# Patient Record
Sex: MALE | Race: WHITE | NOT HISPANIC OR LATINO | URBAN - METROPOLITAN AREA
[De-identification: names, ages, dates, MRNs, and addresses within clinical notes are randomized per-mention and may not be internally consistent; named-entity substitution may affect disease eponyms.]

---

## 2023-10-13 ENCOUNTER — OFFICE VISIT (OUTPATIENT)
Dept: URGENT CARE | Facility: CLINIC | Age: 48
End: 2023-10-13
Payer: OTHER GOVERNMENT

## 2023-10-13 VITALS
TEMPERATURE: 99 F | OXYGEN SATURATION: 99 % | HEART RATE: 97 BPM | DIASTOLIC BLOOD PRESSURE: 81 MMHG | RESPIRATION RATE: 18 BRPM | SYSTOLIC BLOOD PRESSURE: 126 MMHG | WEIGHT: 130 LBS

## 2023-10-13 DIAGNOSIS — S01.01XA LACERATION OF SCALP WITHOUT FOREIGN BODY, INITIAL ENCOUNTER: Primary | ICD-10-CM

## 2023-10-13 PROCEDURE — 90715 TDAP VACCINE GREATER THAN OR EQUAL TO 7YO IM: ICD-10-PCS | Mod: S$GLB,,,

## 2023-10-13 PROCEDURE — 99204 OFFICE O/P NEW MOD 45 MIN: CPT | Mod: 25,S$GLB,,

## 2023-10-13 PROCEDURE — 12002 LACERATION REPAIR: ICD-10-PCS | Mod: S$GLB,,,

## 2023-10-13 PROCEDURE — 90471 PR IMMUNIZ ADMIN,1 SINGLE/COMB VAC/TOXOID: ICD-10-PCS | Mod: S$GLB,,,

## 2023-10-13 PROCEDURE — 12002 RPR S/N/AX/GEN/TRNK2.6-7.5CM: CPT | Mod: S$GLB,,,

## 2023-10-13 PROCEDURE — 90715 TDAP VACCINE 7 YRS/> IM: CPT | Mod: S$GLB,,,

## 2023-10-13 PROCEDURE — 90471 IMMUNIZATION ADMIN: CPT | Mod: S$GLB,,,

## 2023-10-13 PROCEDURE — 99204 PR OFFICE/OUTPT VISIT, NEW, LEVL IV, 45-59 MIN: ICD-10-PCS | Mod: 25,S$GLB,,

## 2023-10-13 RX ORDER — MUPIROCIN 20 MG/G
OINTMENT TOPICAL 2 TIMES DAILY
Qty: 15 G | Refills: 0 | Status: SHIPPED | OUTPATIENT
Start: 2023-10-13 | End: 2023-10-20

## 2023-10-13 RX ORDER — DEXTROAMPHETAMINE SACCHARATE, AMPHETAMINE ASPARTATE, DEXTROAMPHETAMINE SULFATE AND AMPHETAMINE SULFATE 7.5; 7.5; 7.5; 7.5 MG/1; MG/1; MG/1; MG/1
30 TABLET ORAL
COMMUNITY
Start: 2023-09-17

## 2023-10-13 RX ORDER — METHOCARBAMOL 750 MG/1
1 TABLET, FILM COATED ORAL NIGHTLY PRN
COMMUNITY
Start: 2023-05-03

## 2023-10-13 NOTE — PROGRESS NOTES
Subjective:      Patient ID: Eliazar Kendall is a 48 y.o. male.    Vitals:  weight is 59 kg (130 lb). His temperature is 98.6 °F (37 °C). His blood pressure is 126/81 and his pulse is 97. His respiration is 18 and oxygen saturation is 99%.     Chief Complaint: Laceration    Mr. Kendall, no significant past medical history, presents to clinic with a chief complaint of laceration scalp of head.  Patient reports he was at car wash attempting to vacuum truck.  He was climbing into the bed of his truck he went to stand up and cut his head on a metal sign.  He reports significant amount of bleeding prior to arrival denies any LOC.  Denies neck pain.  Denies headache.  Denies nausea.  Hemostasis was achieved prior to arrival.  There is a 4 cm laceration with exposed muscle and adipose tissue.  Chart review, last tetanus 2012.    Laceration   The incident occurred less than 1 hour ago. The laceration is located on the Scalp. The laceration is 4 cm in size. The laceration mechanism was a metal edge. The pain is at a severity of 6/10. The pain is moderate. The pain has been Constant since onset. He reports no foreign bodies present. His tetanus status is out of date.       Neck: Negative for neck pain.   Gastrointestinal:  Negative for nausea.   Skin:  Positive for laceration.   Allergic/Immunologic: Negative for immunizations up-to-date.   Neurological:  Negative for loss of balance, headaches and altered mental status.   Hematologic/Lymphatic: Negative for easy bruising/bleeding, history of blood clots and history of bleeding disorder. Does not bruise/bleed easily.   Psychiatric/Behavioral:  Negative for altered mental status.       Objective:     Physical Exam   Constitutional: He is oriented to person, place, and time. He appears well-developed. He is cooperative.  Non-toxic appearance. He does not appear ill. No distress. normal  HENT:   Head: Normocephalic and atraumatic.       Ears:   Right Ear: Hearing and external ear  normal.   Left Ear: Hearing and external ear normal.   Nose: Nose normal. No mucosal edema or nasal deformity. No epistaxis. Right sinus exhibits no maxillary sinus tenderness and no frontal sinus tenderness. Left sinus exhibits no maxillary sinus tenderness and no frontal sinus tenderness.   Mouth/Throat: Uvula is midline and mucous membranes are normal. Mucous membranes are moist. No trismus in the jaw. Normal dentition. No uvula swelling. No oropharyngeal exudate or posterior oropharyngeal erythema.   Eyes: Conjunctivae and lids are normal. Pupils are equal, round, and reactive to light. Extraocular movement intact   Neck: Trachea normal and phonation normal. Neck supple.   Cardiovascular: Normal rate, regular rhythm, normal heart sounds and normal pulses.   Pulmonary/Chest: Effort normal and breath sounds normal.   Abdominal: Normal appearance. flat abdomen   Musculoskeletal: Normal range of motion.         General: Normal range of motion.   Lymphadenopathy:     He has no cervical adenopathy.   Neurological: no focal deficit. He is alert, oriented to person, place, and time and at baseline. He exhibits normal muscle tone.   Skin: Skin is warm, dry, intact and not diaphoretic. Capillary refill takes 2 to 3 seconds. Lacerations - head:  scalp  Psychiatric: His speech is normal and behavior is normal. Mood, judgment and thought content normal.   Nursing note and vitals reviewed.      Assessment:     1. Laceration of scalp without foreign body, initial encounter        Plan:       Laceration of scalp without foreign body, initial encounter  -     mupirocin (BACTROBAN) 2 % ointment; Apply topically 2 (two) times daily. for 7 days  Dispense: 15 g; Refill: 0  -     (In Office Administered) Tdap Vaccine  -     Laceration Repair

## 2023-10-13 NOTE — PROCEDURES
"Laceration Repair    Date/Time: 10/13/2023 11:00 AM    Performed by: Ian Mendez FNP  Authorized by: Ian Mendez FNP  Consent Done: Yes  Consent: Verbal consent obtained.  Risks and benefits: risks, benefits and alternatives were discussed  Consent given by: patient  Patient understanding: patient states understanding of the procedure being performed  Patient consent: the patient's understanding of the procedure matches consent given  Procedure consent: procedure consent matches procedure scheduled  Relevant documents: relevant documents present and verified  Test results: test results available and properly labeled  Site marked: the operative site was marked  Imaging studies: imaging studies available  Required items: required blood products, implants, devices, and special equipment available  Patient identity confirmed: verbally with patient  Time out: Immediately prior to procedure a "time out" was called to verify the correct patient, procedure, equipment, support staff and site/side marked as required.  Body area: head/neck  Location details: scalp  Laceration length: 4 cm  Foreign bodies: no foreign bodies  Tendon involvement: none  Nerve involvement: none  Vascular damage: no  Anesthesia: local infiltration    Anesthesia:  Local Anesthetic: lidocaine 1% without epinephrine  Anesthetic total: 6 mL    Patient sedated: no  Preparation: Patient was prepped and draped in the usual sterile fashion.  Irrigation solution: saline  Irrigation method: jet lavage  Amount of cleaning: standard  Debridement: none  Skin closure: Ethilon  Number of sutures: 4  Technique: simple  Approximation: close  Approximation difficulty: simple  Dressing: antibiotic ointment and non-stick sterile dressing  Patient tolerance: Patient tolerated the procedure well with no immediate complications        "

## 2023-10-13 NOTE — PATIENT INSTRUCTIONS
Suture removal in 7 days  Do not allowed to soak in water  Do not use hydrogen peroxide or alcohol  Clean with regular soap and water do not scrub aggressively  Mupirocin ointment only enough to make the skin appear shiny  Keep covered with a nonadherent dressing

## 2023-10-20 ENCOUNTER — OFFICE VISIT (OUTPATIENT)
Dept: URGENT CARE | Facility: CLINIC | Age: 48
End: 2023-10-20
Payer: OTHER GOVERNMENT

## 2023-10-20 VITALS
TEMPERATURE: 98 F | HEART RATE: 92 BPM | RESPIRATION RATE: 16 BRPM | OXYGEN SATURATION: 99 % | SYSTOLIC BLOOD PRESSURE: 123 MMHG | WEIGHT: 179.63 LBS | DIASTOLIC BLOOD PRESSURE: 84 MMHG

## 2023-10-20 DIAGNOSIS — Z48.02 ENCOUNTER FOR REMOVAL OF SUTURES: Primary | ICD-10-CM

## 2023-10-20 PROCEDURE — 99214 OFFICE O/P EST MOD 30 MIN: CPT | Mod: S$GLB,,, | Performed by: NURSE PRACTITIONER

## 2023-10-20 PROCEDURE — 99214 PR OFFICE/OUTPT VISIT, EST, LEVL IV, 30-39 MIN: ICD-10-PCS | Mod: S$GLB,,, | Performed by: NURSE PRACTITIONER

## 2023-10-21 NOTE — PROCEDURES
Suture Removal    Date/Time: 10/20/2023 4:10 PM  Location procedure was performed: [facility]    Performed by: Loyda Medeiros NP  Authorized by: Loyda Medeiros NP  Body area: head/neck  Location details: scalp  Wound Appearance: clean, well healed, normal color, nontender and no drainage  Sutures Removed: 4  Post-removal: no dressing applied and antibiotic ointment applied  Facility: sutures placed in this facility  Complications: No  Estimated blood loss (mL): 0  Specimens: No  Implants: No  Patient tolerance: Patient tolerated the procedure well with no immediate complications

## 2023-10-21 NOTE — PROGRESS NOTES
Subjective:      Patient ID: Eliazar Kendall is a 48 y.o. male.    Vitals:  weight is 81.5 kg (179 lb 9.6 oz). His oral temperature is 97.5 °F (36.4 °C). His blood pressure is 123/84 and his pulse is 92. His respiration is 16 and oxygen saturation is 99%.     Chief Complaint: No chief complaint on file.    Presents for suture removal from laceration top of scalp from October 13th      Musculoskeletal:  Negative for pain.   Skin:  Positive for laceration (Top of scalp with 4 sutures in place ready for removal on this date). Negative for erythema.      Objective:     Physical Exam   Constitutional: He is oriented to person, place, and time.  Non-toxic appearance. He does not appear ill. No distress.   HENT:   Head: Normocephalic and atraumatic.   Nose: Nose normal.   Mouth/Throat: Mucous membranes are moist.   Eyes: Conjunctivae are normal.   Pulmonary/Chest: Effort normal. No respiratory distress.   Abdominal: Normal appearance.   Neurological: no focal deficit. He is alert and oriented to person, place, and time.   Skin: Skin is warm, dry and no rash. Capillary refill takes 2 to 3 seconds. lesion No bruising and No erythema         Comments: 4 cm linear laceration top of scalp with 4 sutures in place.  Edges well approximated, no erythema, no edema, no drainage   Psychiatric: His behavior is normal. Mood normal.   Nursing note and vitals reviewed.      Assessment:     1. Encounter for removal of sutures        Plan:       Encounter for removal of sutures  -     Suture Removal

## 2025-05-26 ENCOUNTER — HOSPITAL ENCOUNTER (EMERGENCY)
Facility: HOSPITAL | Age: 50
Discharge: HOME | End: 2025-05-26
Attending: EMERGENCY MEDICINE
Payer: OTHER GOVERNMENT

## 2025-05-26 VITALS
BODY MASS INDEX: 29.26 KG/M2 | HEART RATE: 70 BPM | OXYGEN SATURATION: 100 % | WEIGHT: 182.06 LBS | HEIGHT: 66 IN | DIASTOLIC BLOOD PRESSURE: 84 MMHG | RESPIRATION RATE: 16 BRPM | SYSTOLIC BLOOD PRESSURE: 126 MMHG | TEMPERATURE: 98 F

## 2025-05-26 DIAGNOSIS — H57.89 IRRITATION OF RIGHT EYE: Primary | ICD-10-CM

## 2025-05-26 DIAGNOSIS — S05.91XA RIGHT EYE INJURY, INITIAL ENCOUNTER: ICD-10-CM

## 2025-05-26 PROCEDURE — 25000003 PHARM REV CODE 250

## 2025-05-26 PROCEDURE — 99283 EMERGENCY DEPT VISIT LOW MDM: CPT

## 2025-05-26 RX ORDER — PROPARACAINE HYDROCHLORIDE 5 MG/ML
1 SOLUTION/ DROPS OPHTHALMIC
Status: COMPLETED | OUTPATIENT
Start: 2025-05-26 | End: 2025-05-26

## 2025-05-26 RX ORDER — ERYTHROMYCIN 5 MG/G
OINTMENT OPHTHALMIC
Qty: 5 G | Refills: 0 | Status: SHIPPED | OUTPATIENT
Start: 2025-05-26

## 2025-05-26 RX ADMIN — FLUORESCEIN SODIUM 1 EACH: 1 STRIP OPHTHALMIC at 07:05

## 2025-05-26 RX ADMIN — PROPARACAINE HYDROCHLORIDE 1 DROP: 5 SOLUTION/ DROPS OPHTHALMIC at 07:05

## 2025-05-26 NOTE — ED NOTES
Assumed care:  Eliazar Kendall is awake, alert and oriented x 3, skin warm and dry, in NAD.  States that he thinks he got super glue in his right eye.    Patient identifiers for Eliazar Kendall checked and correct.  LOC:  Eliazar Kendall is awake, alert, and aware of environment with an appropriate affect. He is oriented x 3 and speaking appropriately.  APPEARANCE:  He is resting comfortably and in no acute distress. He is clean and well groomed, patient's clothing is properly fastened.  SKIN:  The skin is warm and dry. He has normal skin turgor and moist mucus membranes. Skin is intact; no bruising or breakdown noted.  MUSCULOSKELETAL:  He is moving all extremities well, no obvious deformities noted. Pulses intact.   RESPIRATORY:  Airway is open and patent. Respirations are spontaneous and non-labored with normal effort and rate.  CARDIAC:  He has a normal rate and rhythm. No peripheral edema noted. Capillary refill < 3 seconds.  ABDOMEN:  No distention noted.  Soft and non-tender upon palpation.  NEUROLOGICAL:  PERRL. Facial expression is symmetrical. Hand grasps are equal bilaterally. Normal sensation in all extremities when touched with finger.  Allergies reported:    Review of patient's allergies indicates:   Allergen Reactions    Zolpidem Hallucinations

## 2025-05-26 NOTE — ED PROVIDER NOTES
Encounter Date: 5/26/2025       History     Chief Complaint   Patient presents with    Eye Problem     Pt touched his right eye with super glue on his finger      49-year-old male with no medical history presents to the ED for right eye problem.  Patient states tonight he had super glue on his finger and then touched his right eye.  Patient complaining of burning 2/10 pain.  Tried eye flushes with no relief.  Does not wear contacts or glasses.  Admits to blurry vision and redness.  Denies fever, sweats, chills, purulent discharge, watery discharge, eye swelling, photophobia, nausea, vomiting, headaches, dizziness, and lightheadedness.      Review of patient's allergies indicates:   Allergen Reactions    Zolpidem Hallucinations     History reviewed. No pertinent past medical history.  History reviewed. No pertinent surgical history.  No family history on file.  Social History[1]  Review of Systems   Constitutional:  Negative for chills, diaphoresis and fever.   Eyes:  Positive for pain, redness and visual disturbance. Negative for photophobia, discharge and itching.   Gastrointestinal:  Negative for nausea and vomiting.   Neurological:  Negative for dizziness, weakness, light-headedness and headaches.       Physical Exam     Initial Vitals [05/26/25 1839]   BP Pulse Resp Temp SpO2   137/85 85 16 97.9 °F (36.6 °C) 100 %      MAP       --         Physical Exam    Nursing note and vitals reviewed.  Constitutional: Vital signs are normal. He appears well-developed and well-nourished. He is not diaphoretic. He is active. He does not appear ill. No distress.   HENT:   Head: Normocephalic and atraumatic.   Right Ear: External ear normal.   Left Ear: External ear normal.   Nose: Nose normal.   Eyes: Conjunctivae, EOM and lids are normal. Pupils are equal, round, and reactive to light. Right eye exhibits no discharge. Left eye exhibits no discharge. Right conjunctiva is not injected. Right conjunctiva has no hemorrhage. Left  conjunctiva is not injected. Left conjunctiva has no hemorrhage. No scleral icterus. Right eye exhibits normal extraocular motion and no nystagmus. Left eye exhibits normal extraocular motion and no nystagmus.   Slit lamp exam:       The right eye shows no corneal abrasion, no corneal flare, no corneal ulcer, no foreign body, no hyphema, no hypopyon, no fluorescein uptake and no anterior chamber bulge.   Normal EOMs with no pain. PERRL.  No fluorescein uptake.  No periorbital swelling, erythema, or warmth.  No foreign body appreciated.   Neck: Phonation normal. Neck supple.   Normal range of motion.   Full passive range of motion without pain.     Pulmonary/Chest: Effort normal. No respiratory distress.   Abdominal: He exhibits no distension.   Musculoskeletal:         General: Normal range of motion.      Cervical back: Full passive range of motion without pain, normal range of motion and neck supple.     Neurological: He is alert and oriented to person, place, and time. He has normal strength. No sensory deficit. GCS eye subscore is 4. GCS verbal subscore is 5. GCS motor subscore is 6.   Skin: Skin is dry. Capillary refill takes less than 2 seconds.         ED Course   Procedures  Labs Reviewed - No data to display       Imaging Results    None          Medications   fluorescein ophthalmic strip 1 each (1 each Right Eye Given 5/26/25 1902)   proparacaine 0.5 % ophthalmic solution 1 drop (1 drop Right Eye Given 5/26/25 1902)     Medical Decision Making  49-year-old male with no medical history presents to the ED for right eye problem.  Patient's chart and medical history reviewed.    Ddx:  Corneal abrasion  Corneal ulcer  FB  Conjunctivitis    Patient's vitals reviewed.  Afebrile, no respiratory distress, and nontoxic-appearing in the ED. Patient had normal EOMs with no pain. PERRL.  No fluorescein uptake.  No periorbital swelling, erythema, or warmth.  No foreign body appreciated.  Denied pain medication. Right  Visual Test: 20/40. Left Visual Test: 20/40. Both Visual Test :20/20.  Discussed with patient to do warm compresses and avoid rubbing the eye, he verbalized understanding.  Patient sent home with erythromycin ointment for prophylactic treatment infection. Patient agrees with this plan. Discussed with him strict return precautions, he verbalized understanding. Patient is stable for discharge.     Amount and/or Complexity of Data Reviewed  External Data Reviewed: labs, radiology and notes.    Risk  Prescription drug management.                                      Clinical Impression:  Final diagnoses:  [H57.89] Irritation of right eye (Primary)  [S05.91XA] Right eye injury, initial encounter - super glue          ED Disposition Condition    Discharge Stable          ED Prescriptions       Medication Sig Dispense Start Date End Date Auth. Provider    erythromycin (ROMYCIN) ophthalmic ointment Place a 1/2 inch ribbon of ointment into the lower eyelid every 6 hours for 5 days 5 g 5/26/2025 -- Holdsworth, Alayna, PA-C          Follow-up Information       Follow up With Specialties Details Why Contact Info    [facility], [facility]  Call   [address] [facility] [address]  [phone]                     [1]   Social History  Tobacco Use    Smoking status: Never    Smokeless tobacco: Never   Substance Use Topics    Alcohol use: Not Currently    Drug use: Yes        Holdsworth, Alayna, PA-C  05/26/25 1956

## 2025-05-27 NOTE — DISCHARGE INSTRUCTIONS

## 2025-05-28 DIAGNOSIS — M25.541 PAIN IN JOINTS OF RIGHT HAND: Primary | ICD-10-CM

## 2025-05-29 DIAGNOSIS — M25.541 PAIN IN JOINTS OF RIGHT HAND: Primary | ICD-10-CM

## 2025-05-29 DIAGNOSIS — M79.641 RIGHT HAND PAIN: Primary | ICD-10-CM

## 2025-06-05 ENCOUNTER — TELEPHONE (OUTPATIENT)
Dept: ORTHOPEDICS | Facility: CLINIC | Age: 50
End: 2025-06-05
Payer: OTHER GOVERNMENT

## 2025-07-01 DIAGNOSIS — M79.641 RIGHT HAND PAIN: Primary | ICD-10-CM

## 2025-07-10 ENCOUNTER — TELEPHONE (OUTPATIENT)
Dept: ORTHOPEDICS | Facility: CLINIC | Age: 50
End: 2025-07-10
Payer: OTHER GOVERNMENT

## 2025-07-14 ENCOUNTER — HOSPITAL ENCOUNTER (OUTPATIENT)
Dept: RADIOLOGY | Facility: OTHER | Age: 50
Discharge: HOME | End: 2025-07-14
Attending: ORTHOPAEDIC SURGERY
Payer: OTHER GOVERNMENT

## 2025-07-14 ENCOUNTER — OFFICE VISIT (OUTPATIENT)
Dept: ORTHOPEDICS | Facility: CLINIC | Age: 50
End: 2025-07-14
Payer: OTHER GOVERNMENT

## 2025-07-14 DIAGNOSIS — S69.91XA: Primary | ICD-10-CM

## 2025-07-14 DIAGNOSIS — M25.541 PAIN IN JOINTS OF RIGHT HAND: ICD-10-CM

## 2025-07-14 DIAGNOSIS — M79.641 RIGHT HAND PAIN: ICD-10-CM

## 2025-07-14 PROCEDURE — 73130 X-RAY EXAM OF HAND: CPT | Mod: TC,FY,RT

## 2025-07-14 PROCEDURE — 99204 OFFICE O/P NEW MOD 45 MIN: CPT | Mod: S$PBB,,, | Performed by: ORTHOPAEDIC SURGERY

## 2025-07-14 PROCEDURE — 73130 X-RAY EXAM OF HAND: CPT | Mod: 26,RT,, | Performed by: RADIOLOGY

## 2025-07-14 PROCEDURE — 99999 PR PBB SHADOW E&M-EST. PATIENT-LVL II: CPT | Mod: PBBFAC,,, | Performed by: ORTHOPAEDIC SURGERY

## 2025-07-14 PROCEDURE — 99212 OFFICE O/P EST SF 10 MIN: CPT | Mod: PBBFAC,25 | Performed by: ORTHOPAEDIC SURGERY

## 2025-07-14 NOTE — PROGRESS NOTES
[facility]  History & Physical  Orthopedics    SUBJECTIVE:        Chief Complaint: Right index finger pain    History of Present Illness:  Patient is a 49 y.o. right hand dominant male who presents today with complaints of right index finger pain.     The patient is a .    Onset of symptoms/DOI was in April. He was cutting ceramic tile when a fragment of tile lacerated the dorsal surface of his index finger PIP joint. He waited a few days before presenting to an OSH where x-rays were negative for foreign body or fractures. Laceration has completely healed without issues, however, he is still having pain and swelling in this joint.    Symptoms are aggravated by activity and trauma to the finger.    Symptoms consist of pain and swelling.    Patient has not attempted any treatment or therapy at this time.     The patient denies any fevers, chills, N/V, D/C and presents for evaluation.       No past medical history on file.  No past surgical history on file.  Review of patient's allergies indicates:   Allergen Reactions    Zolpidem Hallucinations     Social History     Social History Narrative    Not on file     No family history on file.    Current Medications[1]      Review of Systems:  As per HPI otherwise noncontributory    OBJECTIVE:      Vital Signs (Most Recent):  There were no vitals filed for this visit.  There is no height or weight on file to calculate BMI.      Physical Exam:  Constitutional: The patient appears well-developed and well-nourished. No distress.   Skin: No lesions appreciated  Head: Normocephalic and atraumatic.   Nose: Nose normal.   Ears: No deformities seen  Eyes: Conjunctivae and EOM are normal.   Neck: No tracheal deviation present.   Cardiovascular: Normal rate and intact distal pulses.    Pulmonary/Chest: Effort normal. No respiratory distress.   Abdominal: There is no guarding.   Neurological: The patient is alert.   Psychiatric: The patient has  a normal mood and affect.     Right Hand/Wrist Examination:    Observation/Inspection:  Swelling  Localized swelling and tenderness over the dorsal surface of the index finger PIP joint    Deformity  none  Discoloration  none     Scars   none    Atrophy  none    HAND/WRIST EXAMINATION:  Fermín test  Neg      Neurovascular Exam:  Digits WWP, brisk CR < 3s throughout    Right index finger flexion slightly limited due to pain with terminal flexion, ROM hand otherwise full and painless    ROM wrist full, painless    ROM elbow full, painless    Abdomen not guarded  Respirations nonlabored  Perfusion intact    Diagnostic Results:     Imaging - I independently viewed the patient's imaging as well as the radiology report.  Xrays of the patient's right hand demonstrate no evidence of any acute fractures or dislocations or significant degenerative changes.    ASSESSMENT/PLAN:      49 y.o. yo male with ongoing right index finger pain after trauma to the PIP joint  Plan: The patient and I had a thorough discussion today.  We discussed the working diagnosis as well as several other potential alternative diagnoses.  Treatment options were discussed.  Conservative treatment options would include things such as activity modifications, workplace modifications, a period of rest, occupational therapy, and further imaging with MRI and ultrasound.  There are no surgical indications at this time.     At this time, the patient would like to proceed with further imaging. Recommend MRI of the right index finger with and without contrast to evaluate for possible partial central slip injury vs. Infection as well as ultrasound to evaluate for retained foreign body. Patient would like to defer occupational hand therapy at this time.     Should the patient's symptoms worsen, persist, or fail to improve they should return for reevaluation and I would be happy to see them back anytime.    Alexis Zafar MD  Orthopaedic Surgery   Resident  Physician, PGY-1  07/14/2025            [1]   Current Outpatient Medications:     dextroamphetamine-amphetamine 30 mg Tab, 30 mg., Disp: , Rfl:     erythromycin (ROMYCIN) ophthalmic ointment, Place a 1/2 inch ribbon of ointment into the lower eyelid every 6 hours for 5 days (Patient not taking: Reported on 7/14/2025), Disp: 5 g, Rfl: 0    testosterone enanthate 100 mg/0.5 mL AtIn, INJECT 100MG SUBCUTANEOUSLY EVERY WEEK TO REPLENISH TESTOSTERONE, Disp: , Rfl:

## 2025-09-02 ENCOUNTER — TELEPHONE (OUTPATIENT)
Dept: ORTHOPEDICS | Facility: CLINIC | Age: 50
End: 2025-09-02
Payer: OTHER GOVERNMENT